# Patient Record
Sex: MALE | Employment: UNEMPLOYED | URBAN - METROPOLITAN AREA
[De-identification: names, ages, dates, MRNs, and addresses within clinical notes are randomized per-mention and may not be internally consistent; named-entity substitution may affect disease eponyms.]

---

## 2017-01-13 ENCOUNTER — HOSPITAL ENCOUNTER (EMERGENCY)
Age: 32
Discharge: HOME | End: 2017-01-14
Attending: EMERGENCY MEDICINE
Payer: SELF-PAY

## 2017-01-13 DIAGNOSIS — M54.50 ACUTE BILATERAL LOW BACK PAIN WITHOUT SCIATICA: ICD-10-CM

## 2017-01-13 DIAGNOSIS — R30.0 DYSURIA: Primary | ICD-10-CM

## 2017-01-13 DIAGNOSIS — R07.89 CHEST PRESSURE: ICD-10-CM

## 2017-01-13 PROCEDURE — 83690 ASSAY OF LIPASE: CPT | Performed by: FAMILY MEDICINE

## 2017-01-13 PROCEDURE — 99284 EMERGENCY DEPT VISIT MOD MDM: CPT

## 2017-01-13 PROCEDURE — 84484 ASSAY OF TROPONIN QUANT: CPT | Performed by: FAMILY MEDICINE

## 2017-01-13 PROCEDURE — 96360 HYDRATION IV INFUSION INIT: CPT

## 2017-01-13 PROCEDURE — 80053 COMPREHEN METABOLIC PANEL: CPT | Performed by: FAMILY MEDICINE

## 2017-01-13 PROCEDURE — 81001 URINALYSIS AUTO W/SCOPE: CPT | Performed by: FAMILY MEDICINE

## 2017-01-13 PROCEDURE — 85025 COMPLETE CBC W/AUTO DIFF WBC: CPT | Performed by: FAMILY MEDICINE

## 2017-01-13 PROCEDURE — 93005 ELECTROCARDIOGRAM TRACING: CPT

## 2017-01-13 PROCEDURE — 74011250636 HC RX REV CODE- 250/636: Performed by: FAMILY MEDICINE

## 2017-01-13 PROCEDURE — 36415 COLL VENOUS BLD VENIPUNCTURE: CPT | Performed by: FAMILY MEDICINE

## 2017-01-13 RX ORDER — SODIUM CHLORIDE 9 MG/ML
1000 INJECTION, SOLUTION INTRAVENOUS CONTINUOUS
Status: DISCONTINUED | OUTPATIENT
Start: 2017-01-13 | End: 2017-01-14 | Stop reason: HOSPADM

## 2017-01-13 RX ORDER — ONDANSETRON 4 MG/1
4 TABLET, ORALLY DISINTEGRATING ORAL
Status: COMPLETED | OUTPATIENT
Start: 2017-01-13 | End: 2017-01-14

## 2017-01-13 RX ADMIN — SODIUM CHLORIDE 1000 ML/HR: 900 INJECTION, SOLUTION INTRAVENOUS at 23:45

## 2017-01-13 NOTE — ED AVS SNAPSHOT
Rachel Murphy MRN: [id number] Department:  [facility] EMERGENCY DEPT Date of Visit:  1/13/2017 Allergies (verified on: 01/13/17) Agent Severity Comments PENICILLIN G  Generalized Numbness You were seen by the following provider(s): 1. Alexander Longo MD  
 2. Grupo Olguin MD  
  
You Were Diagnosed With   
  
 Comments Dysuria   [id number]. 1. ICD-9-CM]  -  Primary Acute bilateral low back pain without sciatica   [0145037] Chest pressure   [638424] We Performed the Following CBC WITH AUTOMATED DIFF   
 EKG, 12 LEAD, INITIAL   
 LIPASE METABOLIC PANEL, COMPREHENSIVE   
 SAMPLES BEING HELD   
 TROPONIN I   
 URINALYSIS W/ REFLEX CULTURE Current Discharge Medication List  
  
START taking these medications Dose & Instructions Dispensing Information Comments Morning Noon Evening Bedtime  
 ondansetron hcl 4 mg tablet Commonly known as:  ZOFRAN (AS HYDROCHLORIDE) Your next dose is: Today, Tomorrow Other:  _________ Dose:  4 mg Take 1 Tab by mouth every eight (8) hours as needed for Nausea. Quantity:  20 Tab Refills:  0 Where to Get Your Medications Information on where to get these meds will be given to you by the nurse or doctor. ! Ask your nurse or doctor about these medications  
  ondansetron hcl 4 mg tablet ED Disposition ED Disposition Condition Comment Discharged Follow-up Information Follow up With Details Comments Contact Info Lex Ng MD Schedule an appointment as soon as possible for a visit in 3 days Shy Peace en [address] [facility] [address] [address] 
[phone] Discharge Instructions Aprenda sobre cómo tener nestor espalda saludable - [ Learning About How to Have a Healthy Back ] 

Qué causa el dolor de espalda? El dolor de espalda a menudo es causado por uso excesivo, distensión o lesión. Por ejemplo, las personas frecuentemente se lastiman la espalda al practicar deportes o trabajar en el anu, al ser sacudidas en un accidente automovilístico o al levantar algo demasiado pesado. El envejecimiento también desempeña un papel. Manus Gucci y los músculos tienden a perder fuerza a medida que envejece, lo cual aumenta las probabilidades de [place] Rossy Matte. Los discos [employer] huesos de la columna vertebral (vértebras) podrían tener desgaste y ya no proporcionar suficiente amortiguamiento entre los [place]. Un disco que sobresale o que se rompe (hernia de disco) puede presionar sobre los nervios, causando dolor de [place]. En [employer], el dolor de espalda es el resultado de la artritis, de vértebras rotas debido a la pérdida de [employer] (osteoporosis), de nestor enfermedad o de un problema de la columna vertebral. 
Aunque la mayoría de las personas tienen dolor de espalda en un momento u otro, hay medidas que se pueden diogo para reducir la probabilidad de sufrirlo. Cómo puede tener nestor espalda saludable? Reduzca la tensión en la espalda mediante nestor buena [place] El desplomarse o encorvarse por sí solo ilda vez no cause dolor en la parte baja de la espalda. [place] nestor vez que la espalda se ha distendido o lesionado, la marta postura puede empeorar el dolor. · [place] en nestor posición que mantenga las curvas naturales de la espalda y en un colchón cómodo. Duerma de lado con nestor almohada entre las rodillas o boca arriba con nestor almohada debajo de las rodillas. Estas posiciones pueden reducir la tensión en la espalda. · Manténgase erguido cuando esté de pie o sentado. Tener nestor \"buena postura\" por lo general significa que las [place], los hombros y las caderas están en línea recta. · Si debe permanecer de pie mucho tiempo, ponga un pie sobre un taburete, un bordillo o Joana Lore. Cambie de pie cada cierto tiempo. · Siéntese en nestor silla que sea lo suficientemente baja paul para poner ambos pies en el suelo con las rodillas más o menos al nivel de la cadera. Si velázquez silla o velázquez escritorio son [employer], utilice un reposapiés con el fin de elevar las rodillas. Colóquese nestor almohada pequeña, nestor toalla enrollada o un rollo lumbar en la curva de la espalda si necesita más apoyo. · Pruebe usar nestor silla ergonómica con apoyo para las rodillas (\"kneeling chair\"), pues ayuda a inclinar las caderas hacia brielle. [place] le reduce la presión en la parte baja de la espalda. · Intente sentarse sobre nestor pelota de ejercicio. Puede balancearse de lado a lado, lo que ayuda a mantener la espalda relajada. · Al conducir, Ja's las rodillas raissa al nivel de las caderas. Siéntese derecho y conduzca con ambas safia sobre el volante. Los brazos deben estar levemente flexionados. Reduzca la tensión en la espalda levantando objetos con cuidado · Agáchese doblando solo la cadera y las rodillas. Si es necesario, ponga nestor rodilla en el suelo y extienda la otra rodilla frente a usted en ángulo recto (genuflexión). · Empuje el pecho hacia adelante. [place] le ayuda a mantener la parte superior de la espalda derecha mientras mantiene un arco ligero en la parte baja. · Sostenga la carga tan cerca del cuerpo paul sea posible, a la altura del ombligo. · Utilice los pies para cambiar de dirección con pasos cortos. · Dirija con las caderas al cambiar de dirección. Mantenga los hombros en línea con las caderas [place] se desplaza. · Asiente la carga con cuidado, acuclillándose únicamente con las rodillas y las caderas. Ejercite y estire la espalda · Colleen algo de ejercicio la mayoría de los días de la [place], si velázquez médico lo [place]. Puede caminar, correr, nadar o montar en bicicleta. · Estire los músculos de la espalda. Los siguientes son algunos ejercicios que puede probar: ¨ Acuéstese de espalda y lleve suavemente nestor Katha Beery hacia el [place] a poner mk pie en el suelo y luego colleen lo mismo con la otra rodilla. ¨ Colleen inclinaciones de pelvis. Acuéstese de espalda con las rodillas flexionadas. Contraiga los músculos abdominales. Hunda el ombligo hacia adentro y [place], en dirección a las costillas. Deberá sentir paul si la espalda estuviera ejerciendo presión sobre el suelo y que las caderas y la pelvis se despegan levemente del suelo. Mantenga la posición hannah 6 segundos mientras respira de Eva pausada. ¨ Siéntese con la espalda contra la pared. · Mantenga steffanie los músculos del tronco. Los músculos de la espalda, el abdomen y los glúteos sostienen la columna vertebral. 
¨ Hunda el abdomen e imagine que está llevando el ombligo hacia la columna. Mantenga la posición hannah 6 segundos y luego relájese. Recuerde seguir respirando de manera normal [employer]. ¨ Colleen abdominales. Hágalos siempre con las rodillas dobladas. Mantenga la parte baja de la espalda sobre el suelo y [employer] hombros hacia las rodillas con un movimiento lento y uniforme. Mantenga los brazos cruzados sobre el pecho. Si esto le causa molestias en el linette, pruebe a poner las safia detrás del linette (no de la kevan), con los codos abiertos. ¨ Acuéstese boca arriba con las rodillas flexionadas y los pies apoyados sobre el suelo. Contraiga los músculos abdominales y luego empuje con los pies y eleve las nalgas algunas pulgadas. Mantenga la posición hannah 6 segundos mientras continúa respirando de Eva normal y luego vuelva a bajar lentamente hacia el suelo. Repita de 8 a 12 veces. ¨ Si le gusta el ejercicio en tejas, pruebe con Pilates o yoga. Estas clases tienen posiciones que fortalecen los músculos del tronco. 
Lleve un estilo de adry saludable · Mantenga un peso saludable para evitar sobrecargar la espalda. · No fume.  Fumar aumenta el riesgo de osteoporosis, que debilita la columna vertebral. Si necesita ayuda para dejar de fumar, hable con velázquez médico sobre programas y medicamentos para dejar de fumar. Estos pueden aumentar lorraine probabilidades de dejar el hábito para siempre. Dónde puede encontrar más información en inglés? Joel Sun a http://yessica-gael.info/. Escriba L315 en la búsqueda para aprender más acerca de \"Aprenda sobre cómo tener nestor espalda saludable - [ Learning About How to Have a Healthy Back ]. \" 
Revisado: [address], 2016 Versión del contenido: 11.1 © 9756-3308 Healthwise, Incorporated. Las instrucciones de cuidado fueron adaptadas bajo licencia por Good iHandle Connections (which disclaims liability or warranty for this information). Si usted tiene [place] Beebe afección médica o sobre estas instrucciones, siempre pregunte a velázquez profesional de shea. Healthwise, Incorporated niega toda garantía o responsabilidad por velázquez uso de esta información. Dolor de pecho: Instrucciones de cuidado - [ Chest Pain: Care Instructions ] Instrucciones de cuidado El dolor de pecho puede tener muchas causas. Algunas no son graves y mejorarán por sí solas en pocos días. [place] algunos tipos de dolor de pecho requieren más pruebas y [place]. Es posible que velázquez médico le haya recomendado nestor visita de seguimiento dentro de las 8 a 12 horas siguientes. Si no mejora, es posible que necesite [address] pruebas o [place]. Aunque velázquez médico le haya dado de jesse, es necesario que esté atento a cualquier problema que se presente. El médico le hizo un cuidadoso chequeo, [place] a veces los problemas pueden aparecer posteriormente. Si tiene nuevos síntomas o éstos no mejoran, obtenga atención médica de inmediato. Si tiene dolor o presión en el pecho que empeora o es diferente y que dura más de 5 minutos, o se desmayó (perdió el conocimiento), llame al 911 o busque otra ayuda de emergencia de inmediato. Acudir a nestor consulta médica es sólo un paso en velázquez tratamiento. Aunque se sienta mejor, todavía deberá hacer lo que velázquez médico le recomiende, paul asistir a todas las visitas de seguimiento sugeridas y diogo los medicamentos exactamente [employer]	[place] fueron indicados. [place] le ayudará a recuperarse y prevenir problemas futuros. Cómo puede cuidarse en el hogar? · Descanse hasta que se sienta mejor. · [place] lorraine medicamentos exactamente paul le fueron recetados. Llame a velázquez médico si derek estar teniendo problemas con velázquez medicamento. · No conduzca después de diogo un analgésico (medicamento para el dolor) recetado. Cuándo debe pedir ayuda? Llame al 911 si: 
· Se desmayó (perdió el conocimiento). · Tiene graves dificultades para respirar. · Tiene síntomas de un ataque al corazón. Estos podrían incluir: ¨ Dolor o presión en el pecho, o nestor sensación extraña en el pecho. ¨ Sudoración. ¨ Falta de aire. ¨ Náuseas o vómito. ¨ Dolor, presión o nestor sensación extraña en la espalda, el linette, la mandíbula, la parte superior del abdomen o en sumanth o ambos hombros o brazos. ¨ Aturdimiento o debilidad repentina. ¨ Latidos del corazón rápidos o irregulares. Después de llamar al 911, es posible que el operador le diga que mastique 1 aspirina para adultos o de 2 a 4 aspirinas de dosis baja. Espere a nestor ambulancia. No intente conducir usted mismo. Llame hoy a velázquez médico si: · Tiene cualquier dificultad para respirar. · El dolor en el pecho empeora. · Siente mareos o aturdimiento, o que está a punto de [place]. · No mejora paul se esperaba. · Tiene dolor de pecho nuevo o diferente. Dónde puede encontrar más información en inglés? Mingo Cobb a http://yessica-gael.info/. Escriba A120 en la búsqueda para aprender más acerca de \"Dolor de pecho: Instrucciones de cuidado - [ Chest Pain: Care Instructions ]. \" 
Revisado: [address], 2016 Versión del contenido: 11.1 © 5964-7417 Healthwise, Incorporated. Las instrucciones de cuidado fueron adaptadas bajo licencia por Good Help Connections (which disclaims liability or warranty for this information). Si usted tiene [place] afección médica o sobre estas instrucciones, siempre pregunte a velázquez profesional de shea. Healthwise, Incorporated niega toda garantía o responsabilidad por velázquez uso de esta información. [place]! Cami Nolasco introduces iRidge patient portal. Now you can access parts of your medical record, email your doctor's office, and request medication refills online. 1. In your internet browser, go to [email or web address] 2. Click on the First Time User? Click Here link in the Sign In box. You will see the New Member Sign Up page. 3. Enter your iRidge Access Code exactly as it appears below. You will not need to use this code after youve completed the sign-up process. If you do not sign up before the expiration date, you must request a new code. · iRidge Access Code: [email or web address] Expires: 4/13/2017 11:27 PM 
 
4. Enter the last four digits of your Social Security Number (xxxx) and Date of Birth (mm/dd/yyyy) as indicated and click Submit. You will be taken to the next sign-up page. 5. Create a iRidge ID. This will be your iRidge login ID and cannot be changed, so think of one that is secure and easy to remember. 6. Create a iRidge password. You can change your password at any time. 7. Enter your Password Reset Question and Answer. This can be used at a later time if you forget your password. 8. Enter your e-mail address. You will receive e-mail notification when new information is available in [address] 9. Click Sign Up. You can now view and download portions of your medical record. 10. Click the Download Summary menu link to download a portable copy of your medical information. If you have questions, please visit the Frequently Asked Questions section of the Redu.ust website. Remember, Planandoohart is NOT to be used for urgent needs. For medical emergencies, dial 911. Now available from your iPhone and Android! Please provide this summary of care documentation to your next provider. Patient Signature:  ____________________________________________________________ Date:  ____________________________________________________________  
  
Dilan Sport Provider Signature:  ____________________________________________________________ Date:  ____________________________________________________________ Patient received a copy of this document.

## 2017-01-14 VITALS
TEMPERATURE: 97.8 F | HEART RATE: 78 BPM | HEIGHT: 64 IN | BODY MASS INDEX: 21 KG/M2 | DIASTOLIC BLOOD PRESSURE: 71 MMHG | OXYGEN SATURATION: 100 % | SYSTOLIC BLOOD PRESSURE: 131 MMHG | WEIGHT: 123 LBS | RESPIRATION RATE: 16 BRPM

## 2017-01-14 LAB
ALBUMIN SERPL BCP-MCNC: 3.1 G/DL (ref 3.5–5)
ALBUMIN/GLOB SERPL: 1 {RATIO} (ref 1.1–2.2)
ALP SERPL-CCNC: 139 U/L (ref 45–117)
ALT SERPL-CCNC: 148 U/L (ref 12–78)
ANION GAP BLD CALC-SCNC: 5 MMOL/L (ref 5–15)
APPEARANCE UR: ABNORMAL
AST SERPL W P-5'-P-CCNC: 44 U/L (ref 15–37)
BACTERIA URNS QL MICRO: NEGATIVE /HPF
BASOPHILS # BLD AUTO: 0 K/UL (ref 0–0.1)
BASOPHILS # BLD: 1 % (ref 0–1)
BILIRUB SERPL-MCNC: 0.4 MG/DL (ref 0.2–1)
BILIRUB UR QL: NEGATIVE
BUN SERPL-MCNC: 12 MG/DL (ref 6–20)
BUN/CREAT SERPL: 16 (ref 12–20)
CALCIUM SERPL-MCNC: 8.2 MG/DL (ref 8.5–10.1)
CHLORIDE SERPL-SCNC: 103 MMOL/L (ref 97–108)
CO2 SERPL-SCNC: 30 MMOL/L (ref 21–32)
COLOR UR: ABNORMAL
CREAT SERPL-MCNC: 0.74 MG/DL (ref 0.7–1.3)
EOSINOPHIL # BLD: 0 K/UL (ref 0–0.4)
EOSINOPHIL NFR BLD: 1 % (ref 0–7)
EPITH CASTS URNS QL MICRO: ABNORMAL /LPF
ERYTHROCYTE [DISTWIDTH] IN BLOOD BY AUTOMATED COUNT: 11.9 % (ref 11.5–14.5)
GLOBULIN SER CALC-MCNC: 3.1 G/DL (ref 2–4)
GLUCOSE SERPL-MCNC: 105 MG/DL (ref 65–100)
GLUCOSE UR STRIP.AUTO-MCNC: NEGATIVE MG/DL
HCT VFR BLD AUTO: 43.5 % (ref 36.6–50.3)
HGB BLD-MCNC: 15.5 G/DL (ref 12.1–17)
HGB UR QL STRIP: NEGATIVE
HYALINE CASTS URNS QL MICRO: ABNORMAL /LPF (ref 0–5)
KETONES UR QL STRIP.AUTO: NEGATIVE MG/DL
LEUKOCYTE ESTERASE UR QL STRIP.AUTO: NEGATIVE
LIPASE SERPL-CCNC: 97 U/L (ref 73–393)
LYMPHOCYTES # BLD AUTO: 22 % (ref 12–49)
LYMPHOCYTES # BLD: 1.5 K/UL (ref 0.8–3.5)
MCH RBC QN AUTO: 30.3 PG (ref 26–34)
MCHC RBC AUTO-ENTMCNC: 35.6 G/DL (ref 30–36.5)
MCV RBC AUTO: 85 FL (ref 80–99)
MONOCYTES # BLD: 0.6 K/UL (ref 0–1)
MONOCYTES NFR BLD AUTO: 9 % (ref 5–13)
NEUTS SEG # BLD: 4.5 K/UL (ref 1.8–8)
NEUTS SEG NFR BLD AUTO: 67 % (ref 32–75)
NITRITE UR QL STRIP.AUTO: NEGATIVE
PH UR STRIP: 8 [PH] (ref 5–8)
PLATELET # BLD AUTO: 238 K/UL (ref 150–400)
POTASSIUM SERPL-SCNC: 3.6 MMOL/L (ref 3.5–5.1)
PROT SERPL-MCNC: 6.2 G/DL (ref 6.4–8.2)
PROT UR STRIP-MCNC: NEGATIVE MG/DL
RBC # BLD AUTO: 5.12 M/UL (ref 4.1–5.7)
RBC #/AREA URNS HPF: ABNORMAL /HPF (ref 0–5)
SODIUM SERPL-SCNC: 138 MMOL/L (ref 136–145)
SP GR UR REFRACTOMETRY: 1.01 (ref 1–1.03)
TROPONIN I SERPL-MCNC: <0.04 NG/ML
UA: UC IF INDICATED,UAUC: ABNORMAL
UROBILINOGEN UR QL STRIP.AUTO: 0.2 EU/DL (ref 0.2–1)
WBC # BLD AUTO: 6.6 K/UL (ref 4.1–11.1)
WBC URNS QL MICRO: ABNORMAL /HPF (ref 0–4)

## 2017-01-14 PROCEDURE — 74011250637 HC RX REV CODE- 250/637: Performed by: FAMILY MEDICINE

## 2017-01-14 RX ORDER — ONDANSETRON 4 MG/1
4 TABLET, FILM COATED ORAL
Qty: 20 TAB | Refills: 0 | Status: SHIPPED | OUTPATIENT
Start: 2017-01-14

## 2017-01-14 RX ADMIN — ONDANSETRON 4 MG: 4 TABLET, ORALLY DISINTEGRATING ORAL at 00:49

## 2017-01-14 NOTE — ED PROVIDER NOTES
HPI Comments: 28year old male without significant PMH presents with dysuria and low back pain x 1 day. Symptoms started earlier today and are associated with nausea, vomiting and chills. Denies fevers. Denies hematuria or penile discharge. He also reports pressure like chest pain that has been constant for the past 6-7 days, no associate with activity. Reports calf pain as well without erythema or tenderness. The history is provided by the patient. The history is limited by a language barrier. A  was used Burley Sandhoff, RN). History reviewed. No pertinent past medical history. History reviewed. No pertinent past surgical history. History reviewed. No pertinent family history. Social History     Social History    Marital status: SINGLE     Spouse name: N/A    Number of children: N/A    Years of education: N/A     Occupational History    Not on file. Social History Main Topics    Smoking status: Former Smoker    Smokeless tobacco: Never Used    Alcohol use No    Drug use: No    Sexual activity: Not on file     Other Topics Concern    Not on file     Social History Narrative    No narrative on file         ALLERGIES: Penicillin g    Review of Systems   Constitutional: Positive for appetite change and chills. Negative for fever. Respiratory: Positive for chest tightness. Negative for shortness of breath. Cardiovascular: Positive for chest pain. Negative for palpitations and leg swelling. Gastrointestinal: Positive for nausea and vomiting. Negative for abdominal pain, blood in stool and diarrhea. Genitourinary: Positive for dysuria and flank pain. Negative for discharge, genital sores and hematuria. Musculoskeletal: Positive for back pain. All other systems reviewed and are negative.       Vitals:    01/13/17 2320   BP: 117/74   Pulse: 78   Resp: 18   Temp: 97.8 °F (36.6 °C)   SpO2: 97%   Weight: 55.8 kg (123 lb)   Height: 5' 3.78\" (1.62 m) Physical Exam   Constitutional: He is oriented to person, place, and time. He appears well-developed and well-nourished. No distress. HENT:   Head: Normocephalic and atraumatic. Mouth/Throat: No oropharyngeal exudate. Eyes: Conjunctivae and EOM are normal. Pupils are equal, round, and reactive to light. Neck: Normal range of motion. Neck supple. Cardiovascular: Normal rate, regular rhythm and normal heart sounds. No murmur heard. Pulmonary/Chest: Effort normal and breath sounds normal. No respiratory distress. Abdominal: Soft. Bowel sounds are normal. There is no tenderness. There is no rebound. Musculoskeletal: Normal range of motion. Neurological: He is alert and oriented to person, place, and time. He exhibits normal muscle tone. Skin: Skin is warm and dry. He is not diaphoretic. Psychiatric: He has a normal mood and affect. Thought content normal.        MDM  Number of Diagnoses or Management Options  Diagnosis management comments: UTI vs kidney stone vs msk back pain  Chest pain work up as well  Check labs and EKG       Amount and/or Complexity of Data Reviewed  Clinical lab tests: ordered  Tests in the radiology section of CPT®: ordered  Tests in the medicine section of CPT®: ordered  Discuss the patient with other providers: yes (Dr. Maura Subramanian)      ED Course       Procedures    EKG:  NSR. HR 77. Normal axis. T wave inversions in V1-V3. Meets criteria of LVH.      1:48 AM  Labs significant for elevated LFTs. Otherwise, WNL. UA normal.   Will discharge to home with PCP follow up. Assessment:  Low back pain   Dysuria   Chest pressure    Plan:  Recommended Motrin PRN back pain  Needs PCP follow up  Given information for [facility]    Plan discussed with ED attending, Dr. Maura Subramanian.     Elliott Sibley MD

## 2017-01-14 NOTE — ED TRIAGE NOTES
Patient arrives with family with c/o urinary urinary pain, bilateral flank pain, nausea, vomiting and chest pain onset today.

## 2017-01-14 NOTE — DISCHARGE INSTRUCTIONS
Aprenda sobre cómo tener nestor espalda saludable - [ Learning About How to Have a Healthy Back ]  ¿Qué causa el dolor de espalda? El dolor de espalda a menudo es causado por uso excesivo, distensión o lesión. Por ejemplo, las personas frecuentemente se lastiman la espalda al practicar deportes o trabajar en el anu, al ser sacudidas en un accidente automovilístico o al levantar algo demasiado pesado. El envejecimiento también desempeña un papel. Manus Gucci y los músculos tienden a perder fuerza a medida que envejece, lo cual aumenta las probabilidades de Alma Prasad. Los discos [employer] huesos de la columna vertebral (vértebras) podrían tener desgaste y ya no proporcionar suficiente amortiguamiento entre los [place]. Un disco que sobresale o que se rompe (hernia de disco) puede presionar sobre los nervios, causando dolor de [place]. En [employer], el dolor de espalda es el resultado de la artritis, de vértebras rotas debido a la pérdida de [employer] (osteoporosis), de nestor enfermedad o de un problema de la columna vertebral.  Aunque la mayoría de las personas tienen dolor de espalda en un momento u otro, hay medidas que se pueden diogo para reducir la probabilidad de sufrirlo. ¿Cómo puede tener nestor espalda saludable? Reduzca la tensión en la espalda mediante nestor buena postura  El desplomarse o encorvarse por sí solo ilda vez no cause dolor en la parte baja de la espalda. [place] nestor vez que la espalda se ha distendido o lesionado, la marta postura puede empeorar el dolor. · [place] en nestor posición que mantenga las curvas naturales de la espalda y en un colchón cómodo. [place] de lado con nestor almohada entre las rodillas o boca arriba con nestor almohada debajo de las rodillas. Estas posiciones pueden reducir la tensión en la espalda. · Manténgase erguido cuando esté de pie o sentado.  Tener nestor \"buena postura\" por lo general significa que las [place], los hombros y las caderas están en Nelson Moss recta.  · Si debe permanecer de pie mucho tiempo, ponga un pie sobre un taburete, un bordillo o [place]. Cambie de pie cada cierto tiempo. · Siéntese en nestor silla que sea lo suficientemente baja paul para poner ambos pies en el suelo con las rodillas más o menos al nivel de la cadera. Si velázquez silla o velázquez escritorio son [place] Commander, utilice un reposapiés con el fin de elevar las rodillas. Colóquese nestor almohada pequeña, nestor toalla enrollada o un rollo lumbar en la curva de la espalda si necesita más apoyo. · Pruebe usar nestor silla ergonómica con apoyo para las rodillas (\"kneeling chair\"), pues ayuda a inclinar las caderas hacia brielle. [place] le reduce la presión en la parte baja de la espalda. · Intente sentarse sobre nestor pelota de ejercicio. Puede balancearse de lado a lado, lo que ayuda a mantener la espalda relajada. · Al conducir, Ja's las rodillas raissa al nivel de las caderas. Siéntese derecho y conduzca con ambas safia sobre el volante. Los brazos deben estar levemente flexionados. Reduzca la tensión en la espalda levantando objetos con cuidado  · Agáchese doblando solo la cadera y las rodillas. Si es necesario, ponga nestor rodilla en el suelo y extienda la otra rodilla frente a usted en ángulo recto (genuflexión). · Empuje el pecho hacia adelante. [place] le ayuda a mantener la parte superior de la espalda derecha mientras mantiene un arco ligero en la parte baja. · Sostenga la carga tan cerca del cuerpo paul sea posible, a la altura del ombligo. · Utilice los pies para cambiar de dirección con pasos cortos. · Dirija con las caderas al cambiar de dirección. Mantenga los hombros en línea con las caderas [place] se desplaza. · Asiente la carga con cuidado, acuclillándose únicamente con las rodillas y las caderas. Ejercite y estire la espalda  · [place] algo de ejercicio la mayoría de los días de la [place], si velázquez médico lo aprueba. Puede caminar, correr, nadar o montar en bicicleta.   · [employer] músculos de la espalda. Los siguientes son algunos ejercicios que puede probar:  ¨ Acuéstese de espalda y lleve suavemente nestor rodilla flexionada hacia el pecho. Vuelva a poner mk pie en el suelo y luego colleen lo mismo con la otra rodilla. ¨ Colleen inclinaciones de pelvis. Acuéstese de espalda con las rodillas flexionadas. Contraiga los músculos abdominales. Hunda el ombligo hacia adentro y [place], en dirección a las costillas. Deberá sentir paul si la espalda estuviera ejerciendo presión sobre el suelo y que las caderas y la pelvis se despegan levemente del suelo. Mantenga la posición hannah 6 segundos mientras respira de [place] pausada. ¨ Siéntese con la espalda contra la pared. · Mantenga steffanie los músculos del tronco. Los músculos de la espalda, el abdomen y los glúteos sostienen la columna vertebral.  ¨ Hunda el abdomen e imagine que está llevando el ombligo hacia la columna. Mantenga la posición hannah 6 segundos y luego relájese. Recuerde seguir respirando de manera normal [employer]. ¨ Colleen abdominales. Hágalos siempre con las rodillas dobladas. Mantenga la parte baja de la espalda sobre el suelo y [employer] hombros hacia las rodillas con un movimiento lento y uniforme. Mantenga los brazos cruzados sobre el pecho. Si esto le causa molestias en el linette, pruebe a poner las safia detrás del linette (no de la kevan), con los codos abiertos. ¨ Acuéstese boca arriba con las rodillas flexionadas y los pies apoyados sobre el suelo. Contraiga los músculos abdominales y luego empuje con los pies y eleve las nalgas algunas pulgadas. Mantenga la posición hannah 6 segundos mientras continúa respirando de [place] normal y luego vuelva a bajar lentamente hacia el suelo. Repita de 8 a 12 veces. ¨ Si le gusta el ejercicio en tejas, pruebe con Pilates o yoga.  Estas clases tienen posiciones que fortalecen los músculos del tronco.  Lleve un estilo de adry saludable  · Mantenga un peso saludable para evitar sobrecargar la espalda. · No fume. Fumar aumenta el riesgo de osteoporosis, que debilita la columna vertebral. Si necesita ayuda para dejar de fumar, hable con velázquez médico sobre programas y medicamentos para dejar de fumar. Estos pueden aumentar lorraine probabilidades de dejar el hábito para siempre. ¿Dónde puede encontrar más información en inglés? Jean-Paul Blanka a http://yessica-gael.info/. Escriba L315 en la búsqueda para aprender más acerca de \"Aprenda sobre cómo tener nestor espalda saludable - [ Learning About How to Have a Healthy Back ]. \"  Revisado: 23 [place], 2016  Versión del contenido: 11.1  © 6412-5279 Healthwise, Incorporated. Las instrucciones de cuidado fueron adaptadas bajo licencia por [employer] (which disclaims liability or warranty for this information). Si usted tiene [place] afección médica o sobre estas instrucciones, siempre pregunte a velázquez profesional de shea. Healthwise, Incorporated niega toda garantía o responsabilidad por velázquez uso de esta información. Dolor de pecho: Instrucciones de cuidado - [ Chest Pain: Care Instructions ]  Instrucciones de cuidado  El dolor de pecho puede tener muchas causas. Algunas no son graves y mejorarán por sí solas en pocos días. [place] algunos tipos de dolor de pecho requieren más pruebas y [place]. Es posible que velázquez médico le haya recomendado nestor visita de seguimiento dentro de las 8 a 12 horas siguientes. Si no mejora, es posible que necesite [address] pruebas o [place]. Aunque velázquez médico le haya dado de jesse, es necesario que esté atento a cualquier problema que se presente. El médico le hizo un cuidadoso chequeo, [place] a veces los problemas pueden aparecer posteriormente. Si tiene nuevos síntomas o éstos no mejoran, obtenga atención médica de inmediato.   Si tiene dolor o presión en el pecho que empeora o es diferente y que dura más de 5 minutos, o se desmayó (perdió el conocimiento), llame al 911 o busque [facility] ayuda de emergencia de inmediato. Acudir a nestor consulta médica es sólo un paso en velázquez tratamiento. Aunque se sienta mejor, todavía deberá hacer lo que velázquez médico le recomiende, paul asistir a todas las visitas de seguimiento sugeridas y diogo los medicamentos exactamente [employer]	[place] fueron indicados. [place] le ayudará a recuperarse y prevenir problemas futuros. ¿Cómo puede cuidarse en el hogar? · Descanse hasta que se sienta mejor. · [place] lorraine medicamentos exactamente paul le fueron recetados. Llame a velázquez médico si derek estar teniendo problemas con velázquez medicamento. · No conduzca después de diogo un analgésico (medicamento para el dolor) recetado. ¿Cuándo debe pedir ayuda? Llame al 911 si:  · Se desmayó (perdió el conocimiento). · Tiene graves dificultades para respirar. · Tiene síntomas de un ataque al corazón. Estos podrían incluir:  ¨ Dolor o presión en el pecho, o nestor sensación extraña en el pecho. ¨ Sudoración. ¨ Falta de aire. ¨ Náuseas o vómito. ¨ Dolor, presión o nestor sensación extraña en la espalda, el linette, la mandíbula, la parte superior del abdomen o en sumanth o ambos hombros o brazos. ¨ Aturdimiento o debilidad repentina. ¨ Latidos del corazón rápidos o irregulares. Después de llamar al 911, es posible que el operador le diga que mastique 1 aspirina para adultos o de 2 a 4 aspirinas de dosis baja. Espere a nestor ambulancia. No intente conducir usted mismo. Llame hoy a velázquez médico si:  · Tiene cualquier dificultad para respirar. · El dolor en el pecho empeora. · Siente mareos o aturdimiento, o que está a punto de [place]. · No mejora paul se esperaba. · Tiene dolor de pecho nuevo o diferente. ¿Dónde puede encontrar más información en inglés? Penne Felipe a http://yessica-gael.info/. Escriba A120 en la búsqueda para aprender más acerca de \"Dolor de pecho: Instrucciones de cuidado - [ Chest Pain: Care Instructions ]. \"  Revisado: 27 shukla, 2016  Versión del contenido: 11.1  © 9661-9509 Healthwise, Incorporated. Las instrucciones de cuidado fueron adaptadas bajo licencia por [employer] (which disclaims liability or warranty for this information). Si usted tiene [place] afección médica o sobre estas instrucciones, siempre pregunte a velázquez profesional de shea. Healthwise, Incorporated niega toda garantía o responsabilidad por velázquez uso de esta información.

## 2017-01-16 LAB
ATRIAL RATE: 77 BPM
CALCULATED P AXIS, ECG09: 67 DEGREES
CALCULATED R AXIS, ECG10: 70 DEGREES
CALCULATED T AXIS, ECG11: 50 DEGREES
DIAGNOSIS, 93000: NORMAL
P-R INTERVAL, ECG05: 144 MS
Q-T INTERVAL, ECG07: 398 MS
QRS DURATION, ECG06: 94 MS
QTC CALCULATION (BEZET), ECG08: 450 MS
VENTRICULAR RATE, ECG03: 77 BPM

## 2017-01-19 ENCOUNTER — OFFICE VISIT (OUTPATIENT)
Dept: FAMILY MEDICINE CLINIC | Age: 32
End: 2017-01-19

## 2017-01-19 VITALS
TEMPERATURE: 96.3 F | SYSTOLIC BLOOD PRESSURE: 118 MMHG | OXYGEN SATURATION: 98 % | HEART RATE: 70 BPM | RESPIRATION RATE: 16 BRPM | BODY MASS INDEX: 21.27 KG/M2 | WEIGHT: 124.6 LBS | HEIGHT: 64 IN | DIASTOLIC BLOOD PRESSURE: 66 MMHG

## 2017-01-19 DIAGNOSIS — M54.6 ACUTE BILATERAL THORACIC BACK PAIN: Primary | ICD-10-CM

## 2017-01-19 DIAGNOSIS — R79.89 ELEVATED LFTS: ICD-10-CM

## 2017-01-19 LAB
BILIRUB UR QL STRIP: NEGATIVE
GLUCOSE UR-MCNC: NEGATIVE MG/DL
KETONES P FAST UR STRIP-MCNC: NEGATIVE MG/DL
PH UR STRIP: 5.5 [PH] (ref 4.6–8)
PROT UR QL STRIP: NEGATIVE MG/DL
SP GR UR STRIP: 1.02 (ref 1–1.03)
UA UROBILINOGEN AMB POC: NORMAL (ref 0.2–1)
URINALYSIS CLARITY POC: CLEAR
URINALYSIS COLOR POC: YELLOW
URINE BLOOD POC: NEGATIVE
URINE LEUKOCYTES POC: NEGATIVE
URINE NITRITES POC: NEGATIVE

## 2017-01-19 RX ORDER — CYCLOBENZAPRINE HCL 10 MG
10 TABLET ORAL
Qty: 30 TAB | Refills: 0 | Status: SHIPPED | OUTPATIENT
Start: 2017-01-19

## 2017-01-19 RX ORDER — TAMSULOSIN HYDROCHLORIDE 0.4 MG/1
0.4 CAPSULE ORAL DAILY
Qty: 30 CAP | Refills: 0 | Status: SHIPPED | OUTPATIENT
Start: 2017-01-19

## 2017-01-19 NOTE — MR AVS SNAPSHOT
Visit Information [place] die[place] Personal Médico Departamento Teléfono del Dep. Número de visita 1/19/2017  8:35 AM Sai Askew, [facility] [phone] [phone] Upcoming Health Maintenance Date Due DTaP/Tdap/Td series (1 - Tdap) 11/12/2007 INFLUENZA AGE 9 TO ADULT 8/1/2016 Alergias  Review Complete El: 1/19/2017 Por: Bryant Pelayo LPN A partir del:  1/19/2017 Intensidad Anotado Tipo de reacción [employer] Pcn [Penicillins] High 12/16/2016   Systemic Anaphylaxis Penicillin G  01/13/2017    Other (comments) Generalized Numbness Vacunas actuales [place] No hay ninguna vacuna archivada. No revisadas esta visita You Were Diagnosed With   
  
 Yvette Pu Acute bilateral thoracic back pain    -  Primary ICD-10-CM: M54.6 ICD-9-CM: 724.1 Elevated LFTs     ICD-10-CM: R79.89 ICD-9-CM: 790.6 Nausea     ICD-10-CM: R11.0 ICD-9-CM: 787.02 Partes vitales PS Pulso Temperatura Resp [place] ( percentil de crecimiento) Peso (percentil de crecimiento)  
 118/66 (BP 1 Location: Right arm, BP Patient Position: Sitting) 70 96.3 °F (35.7 °C) (Oral) 16 5' 3.78\" (1.62 m) 124 lb 9.6 oz (56.5 kg) SpO2 BMI ([facility]) Estatus de tabaquísmo 98% 21.54 kg/m2 Former Smoker BMI and BSA Data Body Mass Index Body Surface Area  
 21.54 kg/m 2 1.59 m 2 [place] lista de medicamentos actualizada Lista actualizada el: 1/19/17  9:33 AM.  Margot Burns use paris lista de medicamentos más reciente. cyclobenzaprine 10 mg tablet También conocido paul:  FLEXERIL Take 1 Tab by mouth nightly as needed for Muscle Spasm(s). ondansetron hcl 4 mg tablet También conocido paul:  ZOFRAN (AS HYDROCHLORIDE) Take 1 Tab by mouth every eight (8) hours as needed for Nausea. tamsulosin 0.4 mg capsule También conocido paul:  FLOMAX Take 1 Cap by mouth daily. Impresion de recetas Refills  
 cyclobenzaprine (FLEXERIL) 10 mg tablet 0 Sig: Take 1 Tab by mouth nightly as needed for Muscle Spasm(s). Class: Print Route: Oral  
 tamsulosin (FLOMAX) 0.4 mg capsule 0 Sig: Take 1 Cap by mouth daily. Class: Print Route: Oral  
  
Hicimos lo siguiente AMB POC URINALYSIS DIP STICK AUTO W/O MICRO [51501 CPT(R)] METABOLIC PANEL, COMPREHENSIVE [09965 CPT(R)] Por hacer 01/19/2017 Imaging:  XR ABD (AP AND ERECT OR DECUB) Introducing [facility]! [facility] introduce portal paciente MyChart . Ahora se puede acceder a partes de velázquez expediente médico, enviar por correo electrónico la oficina de velázquez médico y solicitar renovaciones de medicamentos en línea. En velázquez navegador de Internet , Sarahi Khan a [email or web address] Betsey clic en el usuario por Iris Redman? Meagan Gasmen clic aquí en la sesión María Bimal. Verá la página de registro [place]. Ingrese velázquez código de [employer] ilda y paul aparece a continuación. Usted no tendrá que [employer] código después de radah completado el proceso de registro . Si usted no se inscribe antes de la fecha de caducidad , debe solicitar un nuevo código. · MyChart Código de acceso : [id number] Expires: 4/13/2017 11:27 PM 
 
Ingresa los últimos cuatro dígitos de velázquez Número de Seguro Social ( xxxx ) y fecha de nacimiento ( dd / mm / aaaa ) paul se indica y betsey clic en Enviar. Usted será llevado a la siguiente página de registro . Crear un ID MyChart . Esta será velázquez ID de inicio de sesión de MyChart y no puede ser [place] , por lo que pensar en nestor que es [place] y fácil de recordar . Crear nestor contraseña MyChart . Usted puede cambiar velázquez contraseña en cualquier momento . Ingrese velázquez Password Reset de preguntas y [place] . [place] se puede utilizar en un momento posterior si usted olvida velázquez contraseña.  
Introduzca velázquez dirección de correo electrónico . Chuy Kidney recibirá [place] notificación por correo electrónico cuando la nueva información está disponible en MyChart . Wei Rocael suh en Registrarse. Mila Lands mani y descargar porciones de velázquez expediente médico. 
Colleen angeli en el enlace de descarga del menú Resumen para descargar nestor copia portátil de velázqeuz información médica . Si tiene [employer] , por favor visite la sección de preguntas frecuentes del sitio web MyCGreen Is Good . Recuerde, iFLYERt NO es que se utilizará para las necesidades urgentes. Para emergencias médicas , llame al 911 . Ahora disponible en velázquez iPhone y Android ! Por favor proporcione jacey resumen de la documentación de cuidado a velázquez próximo proveedor. Your primary care clinician is listed as NONE. If you have any questions after today's visit, please call [phone].

## 2017-01-19 NOTE — PROGRESS NOTES
Outpatient Resident Progress Note    History of Present Illness:     Pt is a 28y.o. year old male, who presents to clinic for:    Chief Complaint   Patient presents with    Back Pain       Patient is following up from the ED, he was [facility] at [facility] on 1/13/17 due to back pain, nausea,vomiting and complaints of seen some blood on his urine few months prior to ED visit. Patient was evaluated and labs were only remarkable for elevated LFTs, UA was negative, was discharged and advised to take OTC ibuprofen prn. Patient today states he is not having nausea or seeing blood on his urine. Complaints of mild bilateral thoracic back pain, states few 2 days ago this was radiating to his bilateral groin but is now improving. States he has hx of kidney stone x 1 few years ago. Otherwise eating and drinking ok. Pain well controlled. No past medical history on file. Current Outpatient Prescriptions on File Prior to Visit   Medication Sig Dispense Refill    ondansetron hcl (ZOFRAN, AS HYDROCHLORIDE,) 4 mg tablet Take 1 Tab by mouth every eight (8) hours as needed for Nausea. 20 Tab 0     No current facility-administered medications on file prior to visit. Allergies:   Allergies   Allergen Reactions    Pcn [Penicillins] Anaphylaxis    Penicillin G Other (comments)     Generalized Numbness         ROS  +back pain  + hematuria, nausea, vomiting although resolved    Objective:     Vitals:    01/19/17 0845   BP: 118/66   Pulse: 70   Resp: 16   Temp: 96.3 °F (35.7 °C)   TempSrc: Oral   SpO2: 98%   Weight: 124 lb 9.6 oz (56.5 kg)   Height: 5' 3.78\" (1.62 m)       Physical Exam:  General appearance - alert, well appearing, and in no distress  Chest - clear to auscultation, no wheezes, rales or rhonchi, symmetric air entry  Heart - normal rate, regular rhythm, normal S1, S2, no murmurs, rubs, clicks or gallops  Abdomen - soft, nontender, nondistended, no masses or organomegaly  Back - patient would complain of bilateral CVA TTP but also bilateral lower back TTP  Neurological - alert, oriented, normal speech, no focal findings or movement disorder   Extremities - peripheral pulses normal, no pedal edema, no clubbing or cyanosis  Skin - normal coloration and turgor, no rashes, no suspicious skin lesions noted    Assessment and Plan:   Pt is a 28y.o. year old male,      ICD-10-CM ICD-9-CM    1. Acute bilateral thoracic back pain M54.6 724.1 UA negative in the office with no hematuria, KUB Abd performed and no stones or abnormalities visualized. Although CT is more specific for further evaluation I would hold on this given patient's benign presentation. Differential includes possible passage of a kidney stone vs musculoskeletal pain. No concerns for UTI with clean UA and no dysuria. Tolerating po, no nausea or vomiting, pain well controlled. Will give a short trial of Flomax and Flexeril as these may help with the above 2 differential, advised that he may try OTC ibuprofen 3-4 times a day as needed. RTC if symptoms get worse although patient is no no distress or pain in the office, I believe he will do fine with above plan. 2. Elevated LFTs R79.89 790.6 Will repeat CMP, if still elevated will get hepatitis panel and possible abdominal US for further evaluation. I have discussed the diagnosis with the patient and the intended plan as seen in the above orders. The patient has received an after-visit summary and questions were answered concerning future plans.     Abrahan Chávez MD  PGY-3 Family Medicine Resident